# Patient Record
Sex: FEMALE | Race: WHITE | NOT HISPANIC OR LATINO | URBAN - METROPOLITAN AREA
[De-identification: names, ages, dates, MRNs, and addresses within clinical notes are randomized per-mention and may not be internally consistent; named-entity substitution may affect disease eponyms.]

---

## 2017-11-14 ENCOUNTER — OFFICE VISIT - HEALTHEAST (OUTPATIENT)
Dept: FAMILY MEDICINE | Facility: CLINIC | Age: 47
End: 2017-11-14

## 2017-11-14 ENCOUNTER — AMBULATORY - HEALTHEAST (OUTPATIENT)
Dept: LAB | Facility: CLINIC | Age: 47
End: 2017-11-14

## 2017-11-14 ENCOUNTER — COMMUNICATION - HEALTHEAST (OUTPATIENT)
Dept: TELEHEALTH | Facility: CLINIC | Age: 47
End: 2017-11-14

## 2017-11-14 DIAGNOSIS — Z13.0 SCREENING, ANEMIA, DEFICIENCY, IRON: ICD-10-CM

## 2017-11-14 DIAGNOSIS — M54.50 CHRONIC BILATERAL LOW BACK PAIN WITHOUT SCIATICA: ICD-10-CM

## 2017-11-14 DIAGNOSIS — Z13.1 SCREENING FOR DIABETES MELLITUS: ICD-10-CM

## 2017-11-14 DIAGNOSIS — G89.29 CHRONIC BILATERAL LOW BACK PAIN WITHOUT SCIATICA: ICD-10-CM

## 2017-11-14 DIAGNOSIS — Z13.29 SCREENING FOR ENDOCRINE DISORDER: ICD-10-CM

## 2017-11-14 DIAGNOSIS — Z13.220 SCREENING, LIPID: ICD-10-CM

## 2017-11-14 DIAGNOSIS — R63.5 WEIGHT GAIN: ICD-10-CM

## 2017-11-14 DIAGNOSIS — R73.9 HYPERGLYCEMIA: ICD-10-CM

## 2017-11-14 DIAGNOSIS — E55.9 VITAMIN D DEFICIENCY DISEASE: ICD-10-CM

## 2017-11-14 DIAGNOSIS — M17.0 OSTEOARTHRITIS OF BOTH KNEES, UNSPECIFIED OSTEOARTHRITIS TYPE: ICD-10-CM

## 2017-11-14 DIAGNOSIS — Z13.228 ENCOUNTER FOR SCREENING FOR METABOLIC DISORDER: ICD-10-CM

## 2017-11-14 DIAGNOSIS — Z13.29 SCREENING FOR THYROID DISORDER: ICD-10-CM

## 2017-11-14 DIAGNOSIS — E78.49 OTHER HYPERLIPIDEMIA: ICD-10-CM

## 2017-11-14 DIAGNOSIS — K21.9 GASTROESOPHAGEAL REFLUX DISEASE WITHOUT ESOPHAGITIS: ICD-10-CM

## 2017-11-14 RX ORDER — AMITRIPTYLINE HYDROCHLORIDE 50 MG/1
TABLET ORAL
Refills: 3 | Status: SHIPPED | COMMUNITY
Start: 2017-10-04

## 2017-11-14 ASSESSMENT — MIFFLIN-ST. JEOR: SCORE: 1893.91

## 2017-11-14 NOTE — ASSESSMENT & PLAN NOTE
Vitamin d level is low. It is 21.3 but I would like to see it closer to 60.  I recommend you starttaking (or increase current intake of) over the counter vitamin D3, 2000 IU daily.  We can recheck your vitamin D level in 3 months or at your next visit.

## 2017-11-14 NOTE — ASSESSMENT & PLAN NOTE
Lab Results   Component Value Date    CHOL 225 (H) 11/14/2017     Lab Results   Component Value Date    HDL 63 11/14/2017     Lab Results   Component Value Date    LDLCALC 133 (H) 11/14/2017     Lab Results   Component Value Date    TRIG 146 11/14/2017         Weight reduction recommended.

## 2017-11-14 NOTE — ASSESSMENT & PLAN NOTE
INSULIN RESISTANCE/ METABOLIC SYNDROME WORK UPCONCERNING  Lab Results    Value Date    HGBA1C 5.3 11/14/2017      Fasting blood sugar was 87.  (>100 indicative of metabolic syndrome)  Waist circumference was 54 in greater than 35 inches in women and 40 in men is indicative of metabolic syndrome. And central obesity was apparent.  Triglycerides were 146  (>150 is indicative of metabolic syndrome)  HDL 63 (<40 in females and <50 in males is indicative of metabolic syndrome)  bp was not elevated (greater than 140/85 is indicative of metabolic syndrome)  When three or more of the above are present it is an indication that Metabolic syndrome is present - for now we will watch and strongly recommend weight reduction.    Her elevated waist circumference also is an indication that she  could benefit fromChromium 400 mg orally q day.  So that was prescribed as well.

## 2017-11-14 NOTE — ASSESSMENT & PLAN NOTE
Weight reduction recommended.  She  is participating in the Weight Loss Program at [facility].

## 2017-11-14 NOTE — ASSESSMENT & PLAN NOTE
She has had osteoarthritis in both knees.  She had a right knee replacement in January 2017 and no longer has pain in that knee.  However, she has left knee pain 3-4 times a week.  She likes to do cross fit and this knee pain is hindering her activity.  Weight loss is strongly recommended.

## 2017-11-14 NOTE — ASSESSMENT & PLAN NOTE
She gets this approximately once a week and weight loss is strongly recommended.  She started the weight loss clinic at the [facility] today.

## 2017-11-14 NOTE — ASSESSMENT & PLAN NOTE
If she does not take Prilosec 20 mg orally per day she has symptoms daily.  Currently she is controlled with Prilosec 20 mg orally per day.  Weight loss is strongly recommended.  She is starting the weight loss program at the [facility] today.

## 2017-11-15 LAB
CHOLEST SERPL-MCNC: 225 MG/DL
FASTING STATUS PATIENT QL REPORTED: YES
HBA1C MFR BLD: 5.3 % (ref 3.5–6)
HDLC SERPL-MCNC: 63 MG/DL
LDLC SERPL CALC-MCNC: 133 MG/DL
TRIGL SERPL-MCNC: 146 MG/DL

## 2017-12-12 ENCOUNTER — COMMUNICATION - HEALTHEAST (OUTPATIENT)
Dept: FAMILY MEDICINE | Facility: CLINIC | Age: 47
End: 2017-12-12

## 2017-12-19 ENCOUNTER — OFFICE VISIT - HEALTHEAST (OUTPATIENT)
Dept: FAMILY MEDICINE | Facility: CLINIC | Age: 47
End: 2017-12-19

## 2017-12-19 ASSESSMENT — MIFFLIN-ST. JEOR: SCORE: 1855.81

## 2017-12-19 NOTE — ASSESSMENT & PLAN NOTE
Continue phentermine.     Initial Weight: 276.7 lbs  Weight: 268 lb 4.8 oz (121.7 kg)  Weight loss from initial: 8.4  % Weight loss: 3.04 %

## 2018-01-16 ENCOUNTER — COMMUNICATION - HEALTHEAST (OUTPATIENT)
Dept: FAMILY MEDICINE | Facility: CLINIC | Age: 48
End: 2018-01-16

## 2018-01-23 ENCOUNTER — OFFICE VISIT - HEALTHEAST (OUTPATIENT)
Dept: FAMILY MEDICINE | Facility: CLINIC | Age: 48
End: 2018-01-23

## 2018-01-23 DIAGNOSIS — K21.9 GASTROESOPHAGEAL REFLUX DISEASE WITHOUT ESOPHAGITIS: ICD-10-CM

## 2018-01-23 DIAGNOSIS — M17.0 OSTEOARTHRITIS OF BOTH KNEES, UNSPECIFIED OSTEOARTHRITIS TYPE: ICD-10-CM

## 2018-01-23 DIAGNOSIS — E78.49 OTHER HYPERLIPIDEMIA: ICD-10-CM

## 2018-01-23 DIAGNOSIS — R73.9 HYPERGLYCEMIA: ICD-10-CM

## 2018-01-23 DIAGNOSIS — E55.9 VITAMIN D DEFICIENCY DISEASE: ICD-10-CM

## 2018-01-23 ASSESSMENT — MIFFLIN-ST. JEOR: SCORE: 1831.77

## 2018-01-23 NOTE — ASSESSMENT & PLAN NOTE
Dx: Body mass index of 45, GERD, osteoarthritis of the knees bilaterally resulting in knee replacement on the right and continued knee pain on the left, and chronic bilateral low back pain.  BECAUSE OF ABOVE PROBLEMS IT IS STRONGLY ADVISED THAT Amanda LOSE WEIGHT.  BELOW IS MY PLAN FOR her     Since Amanda has morbid obesity, if conservative management does not work, could consider surgical management in the form of bariatric surgery.       Ana Hodges PA-C -  primary care provider  Start weight 276 lbs, 11/14/2017  - 263 1/23/2018   Initial goal weight: 248-262 (reduce weight 5-10%) by 8/2018    Initial Weight: 276.7 bsb001 lb (119.3 kg)  Weight loss from initial: 13.7  % Weight loss: 4.95 %      Prescribed meds: Phentermine  Supplements: Fish oil, multivitamin, vitamin D, chromium  Goals this month: Start weight loss program, start to pay more attention to protein/ carb ratio on nutrition lables and if at all possible track diet.  Future topics: She says she likes to go out to eat so we should talk about healthy options- she may be interested in the Mo talk talking about how to prevent diabetes, & trigger foods.  Follow up monthly.

## 2018-01-23 NOTE — ASSESSMENT & PLAN NOTE
If she does not take Prilosec 20 mg orally per day she has symptoms daily.  Currently she is controlled with Prilosec 20 mg orally per day.  Weight loss is strongly recommended.

## 2018-02-21 ENCOUNTER — COMMUNICATION - HEALTHEAST (OUTPATIENT)
Dept: FAMILY MEDICINE | Facility: CLINIC | Age: 48
End: 2018-02-21

## 2018-02-27 ENCOUNTER — OFFICE VISIT - HEALTHEAST (OUTPATIENT)
Dept: FAMILY MEDICINE | Facility: CLINIC | Age: 48
End: 2018-02-27

## 2018-02-27 DIAGNOSIS — E78.49 OTHER HYPERLIPIDEMIA: ICD-10-CM

## 2018-02-27 DIAGNOSIS — K21.9 GASTROESOPHAGEAL REFLUX DISEASE WITHOUT ESOPHAGITIS: ICD-10-CM

## 2018-02-27 DIAGNOSIS — E55.9 VITAMIN D DEFICIENCY DISEASE: ICD-10-CM

## 2018-02-27 DIAGNOSIS — M17.0 OSTEOARTHRITIS OF BOTH KNEES, UNSPECIFIED OSTEOARTHRITIS TYPE: ICD-10-CM

## 2018-02-27 DIAGNOSIS — R73.9 HYPERGLYCEMIA: ICD-10-CM

## 2018-02-27 NOTE — ASSESSMENT & PLAN NOTE
Dx: Body mass index of 45, GERD, osteoarthritis of the knees bilaterally resulting in knee replacement onthe right and continued knee pain on the left, and chronic bilateral low back pain.  BECAUSE OF ABOVE PROBLEMS IT IS STRONGLY ADVISED THAT Amanda LOSE WEIGHT.  BELOW IS MY PLAN FOR her     Since Amanda has morbid obesity, if conservative management does not work, could consider surgical management in the form of bariatric surgery.       Ana Hodges PA-C -  primary care provider  Start weight 276 lbs, 11/14/2017  - 262 2/27/2018   Initial goal weight: 248-262 (reduce weight 5-10%) by 8/2018    Initial Weight: 276.7 lbs  Weight: 262 lb 8 oz (119.1 kg)  Weight loss from initial: 14.2  % Weight loss: 5.13 %  Waist Circumference: 55 inches     Prescribed meds: Phentermine   Supplements: Fish oil, multivitamin, vitamin D, chromium  Future topics: stress, insulin levels, cortisol & trigger foods.  Follow up monthly.

## 2018-03-29 ENCOUNTER — COMMUNICATION - HEALTHEAST (OUTPATIENT)
Dept: FAMILY MEDICINE | Facility: CLINIC | Age: 48
End: 2018-03-29

## 2018-03-29 RX ORDER — PHENTERMINE HYDROCHLORIDE 37.5 MG/1
37.5 TABLET ORAL
Qty: 30 TABLET | Refills: 0 | Status: SHIPPED | OUTPATIENT
Start: 2018-03-29

## 2018-04-26 ENCOUNTER — OFFICE VISIT - HEALTHEAST (OUTPATIENT)
Dept: FAMILY MEDICINE | Facility: CLINIC | Age: 48
End: 2018-04-26

## 2018-04-26 DIAGNOSIS — E78.49 OTHER HYPERLIPIDEMIA: ICD-10-CM

## 2018-04-26 DIAGNOSIS — E55.9 VITAMIN D DEFICIENCY DISEASE: ICD-10-CM

## 2018-04-26 DIAGNOSIS — R73.9 HYPERGLYCEMIA: ICD-10-CM

## 2018-04-26 LAB
CHOLEST SERPL-MCNC: 184 MG/DL
FASTING STATUS PATIENT QL REPORTED: YES
HBA1C MFR BLD: 5.3 % (ref 3.5–6)
HDLC SERPL-MCNC: 51 MG/DL
LDLC SERPL CALC-MCNC: 117 MG/DL
TRIGL SERPL-MCNC: 80 MG/DL

## 2018-04-26 ASSESSMENT — MIFFLIN-ST. JEOR: SCORE: 1804.55

## 2018-04-26 NOTE — ASSESSMENT & PLAN NOTE
Dx: Body mass index of 45, GERD, osteoarthritis of the knees bilaterally resulting in knee replacement on the right and continued knee pain on the left, and chronic bilateral low back pain.  BECAUSE OF ABOVE PROBLEMS IT IS STRONGLY ADVISED THAT Amanda LOSE WEIGHT.  BELOW IS MY PLAN FOR her      Since Amanda has morbid obesity, if conservative management does not work, could consider surgical management in the form of bariatric surgery.      Ana Hodges PA-C -  primary care provider  Initial Weight: 276.7 lbs bmi 45 11/14/2017  Weight: 257 lb (116.6 kg) - bmi 42 4/26/2018   Weight loss from initial: 19.7  % Weight loss: 7.12 %    Are you experiencing any side effects to the medications:  No.   Hunger control:  good  - except it is hard on the go.   Exercise was discussed: cross fit 3-4 times a week.   Taking supplements:  taking Fish oil, multivitamin, vitamin D, chromium  Discussed journalingfood:  No. But she stopped drinking diet coke.   Patient is pleased with the current results:  yes  The patient is following the nutrition plan:  yes  Barriers to losing weight:  Discussed diet coke, she has been abstaining from it since the last time we met.  She identifies it as a trigger.    Phentermine 37.5 grams orally per day  11/1/17-present -is currently lost 19.7 pounds on this and continues to lose so we will continue

## 2018-04-27 LAB
25(OH)D3 SERPL-MCNC: 53.6 NG/ML (ref 30–80)
25(OH)D3 SERPL-MCNC: 53.6 NG/ML (ref 30–80)

## 2021-05-31 VITALS — WEIGHT: 263 LBS | BODY MASS INDEX: 42.27 KG/M2 | HEIGHT: 66 IN

## 2021-05-31 VITALS — HEIGHT: 66 IN | WEIGHT: 268.3 LBS | BODY MASS INDEX: 43.12 KG/M2

## 2021-05-31 VITALS — BODY MASS INDEX: 44.47 KG/M2 | HEIGHT: 66 IN | WEIGHT: 276.7 LBS

## 2021-06-01 VITALS — WEIGHT: 262.5 LBS | BODY MASS INDEX: 43.02 KG/M2

## 2021-06-01 VITALS — BODY MASS INDEX: 41.3 KG/M2 | HEIGHT: 66 IN | WEIGHT: 257 LBS

## 2021-06-14 NOTE — PROGRESS NOTES
ASSESSMENT AND PLAN:   I spent 40 minutes with the patient. 100 % of that time was spent face to face.    Dx: Body mass index of 45, GERD, osteoarthritis of the knees bilaterally resulting in knee replacement on the right and continued knee pain on the left, and chronic bilateral low back pain.  BECAUSE OF ABOVE PROBLEMS IT IS STRONGLY ADVISED THAT Amanda LOSE WEIGHT.  BELOW IS MY PLAN FOR her     Since Amanda has morbid obesity, if conservative management does not work, could consider surgical management in the form of bariatric surgery.       Ana Hodges PA-C -  primary care provider  Start weight 276 lbs, 11/14/2017    Initial goal weight: 248-262 (reduce weight 5-10%) by 8/2018     Prescribed meds: Phentermine  Supplements: Fish oil, multivitamin, vitamin D, chromium  Goals this month: Start weight loss program, start to pay more attention to protein/ carb ratio on nutrition lables and if at all possible track diet.  Future topics: She says she likes to go out to eat so we should talk about healthy options- she may be interested in the Mo talk talking about how to prevent diabetes, & trigger foods.  Follow up monthly.    Recommended macronutrients;   Calories: 1630 daily  Protein  grams per day  carbs 50-75 grams per day   Healthy fats (nuts, cheese, olive oil, butter, avacado etc) to satiety but not excess       Problem List Items Addressed This Visit     BMI 45.0-49.9, adult     Weight reduction recommended.  She  is participating in the Weight Loss Program at [facility].          GERD (gastroesophageal reflux disease)     If she does not take Prilosec 20 mg orally per day she has symptoms daily.  Currently she is controlled with Prilosec 20 mg orally per day.  Weight loss is strongly recommended.  She is starting the weight loss program at the [facility] clinic today.         Osteoarthritis of knees, bilateral     She has had osteoarthritis in both knees.  She had a right  knee replacement in January 2017 and no longer has pain in that knee.  However, she has left knee pain 3-4 times a week.  She likes to do cross fit and this knee pain is hindering her activity.  Weight loss is strongly recommended.         Chronic bilateral low back pain without sciatica     She gets this approximately once a week and weight loss is strongly recommended.  She started the weight loss clinic at the [facility] program today.         hyperlipidemia     Lab Results   Component Value Date    CHOL 225 (H) 11/14/2017     Lab Results   Component Value Date    HDL 63 11/14/2017     Lab Results   Component Value Date    LDLCALC 133 (H) 11/14/2017     Lab Results   Component Value Date    TRIG 146 11/14/2017         Weight reduction recommended.          Hyperglycemia     INSULIN RESISTANCE/ METABOLIC SYNDROME WORK UP CONCERNING  Lab Results   Component Value Date    HGBA1C 5.3 11/14/2017      Fasting blood sugar was 87.  (>100 indicative of metabolic syndrome)  Waist circumference was 54 in greater than 35 inches in women and 40 in men is indicative of metabolic syndrome. And central obesity was apparent.  Triglycerides were 146  (>150 is indicative of metabolic syndrome)  HDL 63 (<40 in females and <50 in males is indicative of metabolic syndrome)  bp was not elevated (greater than 140/85 is indicative of metabolic syndrome)  When three or more of the above are present it is an indication that Metabolic syndrome is present - for now we will watch and strongly recommend weight reduction.    Her elevated waist circumference also is an indication that she  could benefit from Chromium 400 mg orally q day.  So that was prescribed as well.               ______________________________________________________________________    MORE DETAILED DISCUSSION RE TODAY'S VISIT:    OBESITY  Amanda  patient attended group visit to learn about obesity as a disease, an approach to treatment and metabolic factors.   Nutrition counseling reviewed.    She will start food journaling and contemplating how to increase her activity level as well.     Phentermine discussed. The risks (including addiction, erratic heart rate rhythm, insomnia, anxiety, birth defects, dry mouth, elevated blood pressure, and more) discussed.  There is only one benefit to phentermine - weight loss. She wishes to proceed.  contraception -uses oral contraceptive pills.    THYROID Normal  Lab Results   Component Value Date    TSH 4.62 11/14/2017        INSULIN RESISTANCE/ METABOLIC SYNDROME WORK UP CONCERNING  Lab Results   Component Value Date    HGBA1C 5.3 11/14/2017      Fasting blood sugar was 87.  (>100 indicative of metabolic syndrome)  Waist circumference was 54 in greater than 35 inches in women and 40 in men is indicative of metabolic syndrome. And central obesity was apparent.  Triglycerides were 146  (>150 is indicative of metabolic syndrome)  HDL 63 (<40 in females and <50 in males is indicative of metabolic syndrome)  bp was not elevated (greater than 140/85 is indicative of metabolic syndrome)  When three or more of the above are present it is an indication that Metabolic syndrome is present - for now we will watch and strongly recommend weight reduction.    Her elevated waist circumference also is an indication that she  could benefit from Chromium 400 mg orally q day.  So that was prescribed as well.     VITAMIN D DEFICIENCY  Vitamin D, Total (25-Hydroxy)   Date Value Ref Range Status   11/14/2017 21.3 (L) 30.0 - 80.0 ng/mL Final     Vitamin d level is low. It is 21.3 but I would like to see it closer to 60.  I recommend you start taking (or increase current intake of) over the counter vitamin D3, 2000 IU daily.  We can recheck your vitamin D level in 3 months or at your next visit.        VITAMINS   Recommend a Multivitamin, vitamin D 2000 IU per day and fish oil.  "    _________________________________________________________________    SUBJECTIVE: Amanda Rocha is a 46 y.o. female  comes in for BMI of 45, gastroesophageal reflux disease, osteoarthritis of the knees resulting in knee replacement, and low back pain chronically.  She desires weight reduction.   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  She  has the following hobbies: Shopping, watching TV and doing cross-fit.     WEIGHT LOSS INTAKE    Reason for wanting to join program: \"feel better, healthier\"  Goal weight: 160  Last time at that weight:  Age 29  Overweight whole life:  No: 30's    Family history:    Obesity & high cholesterol    History of illicit drug or alcohol abuse:  No  History of eating disorder:  No    Triggers for eating: hunger, boredom, habit    Typical Daily Food:   Breakfast: variety:  Eggs, yogurt   Lunch: salad/sandwich, fruit   Dinner: meat/veggie, rice   Snacks: popcorn, chocolate    Contraception:  oral contraceptives (estrogen/progesterone)    See weight loss program intake form for more information/details.       ROS  A comprehensive review of systems was negative.  Pertinent items are noted in HPI.    EXAM  Initial Weight: 276.7 lbs  Weight: 276 lb 11.2 oz (125.5 kg)  Weight loss from initial: 0  % Weight loss: 0 %  Body Fat %: 53.5  Waist Circumference: 54 inches  Neck Circumference: 18 inches     /70  Pulse 88  Resp 16  Ht 5' 5.5\" (1.664 m)  Wt (!) 276 lb 11.2 oz (125.5 kg)  LMP 11/06/2017 (Approximate)  Breastfeeding? No  BMI 45.34 kg/m2   Skin: Skin Tags: absent, Acanthosis: absent and Striae: present  Thyroid: normal to inspection and palpation  Cardiac: Regular rate and rhythm  Lungs: clear  Leg Edema: absent  Abdomen: abdomen is soft without significant tenderness, masses, organomegaly or guarding  Other Findings: none   "

## 2021-06-14 NOTE — PROGRESS NOTES
ASSESSMENT AND PLAN:   I spent  25 minutes with the patient. 100 % of that time was spent face to face.    Dx: Body mass index of 45, GERD, osteoarthritis of the knees bilaterally resulting in knee replacement on the right and continued knee pain on the left, and chronic bilateral low back pain.  BECAUSE OF ABOVE PROBLEMS IT IS STRONGLY ADVISED THAT Amanda LOSE WEIGHT.  BELOW IS MY PLAN FOR her     Since Amanda has morbid obesity, if conservative management does not work, could consider surgical management in the form of bariatric surgery.       Ana Hodges PA-C -  primary care provider  Start weight 276 lbs, 11/14/2017  - 268 12/19/2017  Initial goal weight: 248-262 (reduce weight 5-10%) by 8/2018     Prescribed meds: Phentermine  Supplements: Fish oil, multivitamin, vitamin D, chromium  Goals this month: Start weight loss program, start to pay more attention to protein/ carb ratio on nutrition lables and if at all possible track diet.  Future topics: She says she likes to go out to eat so we should talk about healthy options- she may be interested in the Mo talk talking about how to prevent diabetes, & trigger foods.  Follow up monthly.          Problem List Items Addressed This Visit     BMI 45.0-49.9, adult     Weight reduction recommended.  She  is participating in the Weight Loss Program at [facility].          GERD (gastroesophageal reflux disease)     If she does not take Prilosec 20 mg orally per day she has symptoms daily.  Currently she is controlled with Prilosec 20 mg orally per day.  Weight loss is strongly recommended.  She is starting the weight loss program at the [facility] clinic today.         Osteoarthritis of knees, bilateral     She has had osteoarthritis in both knees.  She had a right knee replacement in January 2017 and no longer has pain in that knee.  However, she has left knee pain 3-4 times a week.  She likes to do cross fit and this knee pain is  hindering her activity.  Weight loss is strongly recommended.         Chronic bilateral low back pain without sciatica     She gets this approximately once a week and weight loss is strongly recommended.  She started the weight loss clinic at the [facility] program today.         hyperlipidemia     Lab Results   Component Value Date    CHOL 225 (H) 11/14/2017     Lab Results   Component Value Date    HDL 63 11/14/2017     Lab Results   Component Value Date    LDLCALC 133 (H) 11/14/2017     Lab Results   Component Value Date    TRIG 146 11/14/2017         Weight reduction recommended.          Hyperglycemia     INSULIN RESISTANCE/ METABOLIC SYNDROME WORK UP CONCERNING  Lab Results   Component Value Date    HGBA1C 5.3 11/14/2017      Fasting blood sugar was 87.  (>100 indicative of metabolic syndrome)  Waist circumference was 54 in greater than 35 inches in women and 40 in men is indicative of metabolic syndrome. And central obesity was apparent.  Triglycerides were 146  (>150 is indicative of metabolic syndrome)  HDL 63 (<40 in females and <50 in males is indicative of metabolic syndrome)  bp was not elevated (greater than 140/85 is indicative of metabolic syndrome)  When three or more of the above are present it is an indication that Metabolic syndrome is present - for now we will watch and strongly recommend weight reduction.    Her elevated waist circumference also is an indication that she  could benefit from Chromium 400 mg orally q day.  So that was prescribed as well.                 SUBJECTIVE: Amanda Rocha is a 47 y.o. female  comes in for BMI of 45, gastroesophageal reflux disease, osteoarthritis of the knees resulting in knee replacement, and low back pain chronically.  She desires weight reduction.   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  She  has the following hobbies: Shopping, watching TV and doing cross-fit.     Are you experiencing any side  "effects to the medications:  Phentermine working well, no side effects.   Hunger control:  good  Exercise was discussed: yes - cross fit.   Taking supplements:  Yes fish oil, mvi, vit d, chromium  Discussed journaling food:  No  Breakfast: eggs/ guac, protein shake  Lunch: left overs  Dinner: speg. Squash.  Tomato sauce watching for added sugars  Snacks: dark chocolate almonds  Fastinpm - 9pm.   Drinks: hard liquor - vodka tonic, old fashion, grapefruit with vodka.   Patient is pleased with the current results:  yes  The patient is following the nutrition plan:  no  Barriers to losing weight:  Metabolism:   IR      ROS  A comprehensive review of systems was negative.  Pertinent items are noted in HPI.    EXAM  Initial Weight: 276.7 lbs  Weight: 268 lb 4.8 oz (121.7 kg)  Weight loss from initial: 8.4  % Weight loss: 3.04 %     /78 (Patient Site: Left Arm, Patient Position: Sitting, Cuff Size: Adult Regular)  Pulse 92  Resp 18  Ht 5' 5.5\" (1.664 m)  Wt (!) 268 lb 4.8 oz (121.7 kg)  LMP 2017 (Approximate)  SpO2 95%  Breastfeeding? No  BMI 43.97 kg/m2   Physical Exam   Constitutional: She is oriented to person, place, and time. She appears well-developed and well-nourished. No distress.   HENT:   Head: Normocephalic and atraumatic.   Eyes: Conjunctivae are normal.   Neck: Neck supple.   Cardiovascular: Normal rate and regular rhythm.    Pulmonary/Chest: Effort normal.   Musculoskeletal: Normal range of motion.   Neurological: She is alert and oriented to person, place, and time.   Skin: Skin is warm and dry.   Psychiatric: She has a normal mood and affect.      "

## 2021-06-15 NOTE — PROGRESS NOTES
ASSESSMENT AND PLAN:  I spent 20 minutes with the patient. 100 % of that time was spent face to face.    Problem List Items Addressed This Visit     BMI 40.0-44.9, adult       Dx: Body mass index of 45, GERD, osteoarthritis of the knees bilaterally resulting in knee replacement on the right and continued knee pain on the left, and chronic bilateral low back pain.  BECAUSE OF ABOVE PROBLEMS IT IS STRONGLY ADVISED THAT Amanda LOSE WEIGHT.  BELOW IS MY PLAN FOR her     Since Amanda has morbid obesity, if conservative management does not work, could consider surgical management in the form of bariatric surgery.       Ana Hodges PA-C -  primary care provider  Start weight 276 lbs, 11/14/2017  - 263 1/23/2018   Initial goal weight: 248-262 (reduce weight 5-10%) by 8/2018    Initial Weight: 276.7 lbs  Weight: 263 lb (119.3 kg)  Weight loss from initial: 13.7  % Weight loss: 4.95 %      Prescribed meds: Phentermine  Supplements: Fish oil, multivitamin, vitamin D, chromium  Goals this month: Start weight loss program, start to pay more attention to protein/ carb ratio on nutrition lables and if at all possible track diet.  Future topics: She says she likes to go out to eat so we should talk about healthy options- she may be interested in the Mo talk talking about how to prevent diabetes, & trigger foods.  Follow up monthly.         GERD (gastroesophageal reflux disease)     If she does not take Prilosec 20 mg orally per day she has symptoms daily.  Currently she is controlled with Prilosec 20 mg orally per day.  Weight loss is strongly recommended.             Osteoarthritis of knees, bilateral     she says the left knee no loner bother her and right knee is pain free after surgery 1/2017           hyperlipidemia     Plan to recheck after 2/14/2018           Hyperglycemia     Plan to recheck a1c after 2/14/2018           Vitamin D deficiency disease     Plan to recheck after 2/14/2018                  SUBJECTIVE:  "Amanda Rocha is a 47 y.o. female  comes in for BMI of 45, gastroesophageal reflux disease, osteoarthritis of the knees resulting in knee replacement, and low back pain chronically.  She desires weight reduction.   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  She  has the following hobbies: Shopping, watching TV and doing cross-fit.     She had gynecological surgery Dec 28th, 2017 and is now recovered. She says things are going well and she is not feeling deprived.  Over the holidays she did indulge on Eze Day but tried not to indulge on the days and weeks surrounding that.    Are you experiencing any side effects to the medications:  Phentermine working well, no side effects.   Hunger control:  good  Exercise was discussed: yes - cross fit.   Taking supplements:  Yes fish oil, mvi, vit d, chromium  Discussed journaling food:  No  Breakfast: eggs/ guac, protein shake  Lunch: left overs  Dinner: speg. Squash.  Tomato sauce watching for added sugars  Snacks: nuts, cheese, fruit or dried fruit.   Fastinpm - 9pm.   Drinks: coffee, water, tim. Liquor a few times  A week. hard liquor - vodka tonic, old fashion, grapefruit with vodka.   Patient is pleased with the current results:  yes  The patient is following the nutrition plan:  no  Barriers to losing weight:  Metabolism:   IR      ROS  A comprehensive review of systems was negative.  Pertinent items are noted in HPI.    EXAM  Initial Weight: 276.7 lbs  Weight: 263 lb (119.3 kg)  Weight loss from initial: 13.7  % Weight loss: 4.95 %     /76  Pulse 88  Resp 16  Ht 5' 5.5\" (1.664 m)  Wt (!) 263 lb (119.3 kg)  BMI 43.1 kg/m2   Physical Exam   Constitutional: She is oriented to person, place, and time. She appears well-developed and well-nourished. No distress.   HENT:   Head: Normocephalic and atraumatic.   Eyes: Conjunctivae are normal.   Neck: Neck supple.   Cardiovascular: Normal rate and regular rhythm.  "   Pulmonary/Chest: Effort normal.   Musculoskeletal: Normal range of motion.   Neurological: She is alert and oriented to person, place, and time.   Skin: Skin is warm and dry.   Psychiatric: She has a normal mood and affect.

## 2021-06-16 PROBLEM — M17.0 OSTEOARTHRITIS OF KNEES, BILATERAL: Status: ACTIVE | Noted: 2017-11-14

## 2021-06-16 PROBLEM — G89.29 CHRONIC BILATERAL LOW BACK PAIN WITHOUT SCIATICA: Status: ACTIVE | Noted: 2017-11-14

## 2021-06-16 PROBLEM — E78.49 OTHER HYPERLIPIDEMIA: Status: ACTIVE | Noted: 2017-11-16

## 2021-06-16 PROBLEM — E55.9 VITAMIN D DEFICIENCY DISEASE: Status: ACTIVE | Noted: 2017-11-19

## 2021-06-16 PROBLEM — M54.50 CHRONIC BILATERAL LOW BACK PAIN WITHOUT SCIATICA: Status: ACTIVE | Noted: 2017-11-14

## 2021-06-16 PROBLEM — K21.9 GERD (GASTROESOPHAGEAL REFLUX DISEASE): Status: ACTIVE | Noted: 2017-11-14

## 2021-06-16 PROBLEM — R73.9 HYPERGLYCEMIA: Status: ACTIVE | Noted: 2017-11-16

## 2021-06-16 NOTE — PROGRESS NOTES
ASSESSMENT AND PLAN:  I spent 20 minutes with the patient. 100 % of that time was spent face to face.    Problem List Items Addressed This Visit     BMI 40.0-44.9, adult     Dx: Body mass index of 45, GERD, osteoarthritis of the knees bilaterally resulting in knee replacement on the right and continued knee pain on the left, and chronic bilateral low back pain.  BECAUSE OF ABOVE PROBLEMS IT IS STRONGLY ADVISED THAT Amanda LOSE WEIGHT.  BELOW IS MY PLAN FOR her     Since Amanda has morbid obesity, if conservative management does not work, could consider surgical management in the form of bariatric surgery.       Ana Hodges PA-C -  primary care provider  Start weight 276 lbs, 11/14/2017  - 262 2/27/2018   Initial goal weight: 248-262 (reduce weight 5-10%) by 8/2018    Initial Weight: 276.7 lbs  Weight: 262 lb 8 oz (119.1 kg)  Weight loss from initial: 14.2  % Weight loss: 5.13 %  Waist Circumference: 55 inches     Prescribed meds: Phentermine   Supplements: Fish oil, multivitamin, vitamin D, chromium  Future topics: stress, insulin levels, cortisol & trigger foods.  Follow up monthly.         Relevant Medications    phentermine (ADIPEX-P) 37.5 mg tablet    GERD (gastroesophageal reflux disease)     If she does not take Prilosec 20 mg orally per day she has symptoms daily.  Currently she is controlled with Prilosec 20 mg orally per day.  Weight loss is strongly recommended.         Osteoarthritis of knees, bilateral     Currently pain free.          hyperlipidemia     Plan to recheck - order placed.          Relevant Orders    Lipid Cascade    Hyperglycemia     Plan to recheck - order placed.          Relevant Orders    Glycosylated Hemoglobin A1c    Vitamin D deficiency disease - Primary     Plan to recheck - order placed.          Relevant Orders    Vitamin D, Total (25-Hydroxy)      Other Visit Diagnoses     BMI 45.0-49.9, adult        Relevant Medications    phentermine (ADIPEX-P) 37.5 mg tablet         Chief Complaint   Patient presents with     Weight Loss         SUBJECTIVE: Amanda Rocha is a 47 y.o. female  comes in for BMI of 45, gastroesophageal reflux disease, osteoarthritis of the knees resulting in knee replacement, and low back pain chronically.  She desires weight reduction.   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  She  has the following hobbies: Shopping, watching TV and doing cross-fit.    Are you experiencing any side effects to the medications:  Phentermine working well, no side effects.   Hunger control:  good  Exercise was discussed: yes - cross fit - still doing this.   Taking supplements:  Yes fish oil, mvi, vit d, chromium  Discussed journaling food:  No  Breakfast: eggs/ guac, protein shake  Lunch: left overs  Dinner: speg. Squash.  Tomato sauce watching for added sugars  Snacks: nuts, cheese, fruit or dried fruit.   Fastinpm - 9pm.   Drinks: coffee, water, tim. Liquor a few times  A week. hard liquor - vodka tonic, old fashion, grapefruit with vodka.   Patient is pleased with the current results:  yes  The patient is following the nutrition plan:  no  Barriers to losing weight:  Metabolism:   IR      ROS  A comprehensive review of systems was negative.  Pertinent items are noted in HPI.    EXAM  Initial Weight: 276.7 lbs  Weight: 262 lb 8 oz (119.1 kg)  Weight loss from initial: 14.2  % Weight loss: 5.13 %  Waist Circumference: 55 inches     /68 (Patient Site: Left Arm, Patient Position: Sitting, Cuff Size: Adult Large)  Pulse 74  Wt (!) 262 lb 8 oz (119.1 kg)  BMI 43.02 kg/m2   Physical Exam   Constitutional: She is oriented to person, place, and time. She appears well-developed and well-nourished. No distress.   HENT:   Head: Normocephalic and atraumatic.   Eyes: Conjunctivae are normal.   Neck: Neck supple.   Cardiovascular: Normal rate and regular rhythm.    Pulmonary/Chest: Effort normal.   Musculoskeletal: Normal range  of motion.   Neurological: She is alert and oriented to person, place, and time.   Skin: Skin is warm and dry.   Psychiatric: She has a normal mood and affect.

## 2021-06-17 NOTE — PROGRESS NOTES
ASSESSMENT AND PLAN:    We spent 20 minutes today in direct patient contact, 100% of the time in consultation concerning medical problems as listed below.       Problem List Items Addressed This Visit        Unprioritized    BMI 40.0-44.9, adult     Dx: Body mass index of 45, GERD, osteoarthritis of the knees bilaterally resulting in knee replacement on the right and continued knee pain on the left, and chronic bilateral low back pain.  BECAUSE OF ABOVE PROBLEMS IT IS STRONGLY ADVISED THAT Amanda LOSE WEIGHT.  BELOW IS MY PLAN FOR her      Since Amanda has morbid obesity, if conservative management does not work, could consider surgical management in the form of bariatric surgery.      Ana Hodges PA-C -  primary care provider  Initial Weight: 276.7 lbs bmi 45 11/14/2017  Weight: 257 lb (116.6 kg) - bmi 42 4/26/2018   Weight loss from initial: 19.7  % Weight loss: 7.12 %    Are you experiencing any side effects to the medications:  No.   Hunger control:  good  - except it is hard on the go.   Exercise was discussed: cross fit 3-4 times a week.   Taking supplements:  taking Fish oil, multivitamin, vitamin D, chromium  Discussed journaling food:  No. But she stopped drinking diet coke.   Patient is pleased with the current results:  yes  The patient is following the nutrition plan:  yes  Barriers to losing weight:  Discussed diet coke, she has been abstaining from it since the last time we met.  She identifies it as a trigger.    Phentermine 37.5 grams orally per day  11/1/17-present -is currently lost 19.7 pounds on this and continues to lose so we will continue         hyperlipidemia     She is planning to get this re done today         Hyperglycemia     We will check hemoglobin A1c today         Vitamin D deficiency disease     Vitamin D lab is ordered           Other Visit Diagnoses     BMI 45.0-49.9, adult               Chief Complaint   Patient presents with     Follow-up        HPI  Amanda Rocha is a 47  "y.o. female is happy and feeling like things are going well.  She did travel a little bit and when she traveled she did not take her phentermine.  Still she is feeling good because she lost weight anyway.  Since last time she was here she is feeling really happy that she has not had Diet Coke.  She really likes the Diet Coke from [facility] in particular.    History   Smoking Status     Former Smoker   Smokeless Tobacco     Never Used      Current Outpatient Prescriptions on File Prior to Visit   Medication Sig Dispense Refill     amitriptyline (ELAVIL) 50 MG tablet TAKE 1 TAB BY MOUTH DAILY AT BEDTIME.  3     levonorgestrel (MIRENA) 20 mcg/24 hr (5 years) IUD 1 each by Intrauterine route once.       omeprazole (PRILOSEC) 20 MG capsule TAKE 1 CAP BY MOUTH DAILY. TAKE 1 HOUR BEFORE A MEAL.  2     phentermine (ADIPEX-P) 37.5 mg tablet Take 1 tablet (37.5 mg total) by mouth daily before breakfast. 30 tablet 0     No current facility-administered medications on file prior to visit.       Allergies   Allergen Reactions     Wellbutrin [Bupropion Hcl]        Review of Systems   Constitutional: Negative.    HENT: Negative.    Eyes: Negative.    Respiratory: Negative.    Cardiovascular: Negative.    Gastrointestinal: Negative.    Endocrine: Negative.    Genitourinary: Negative.    Musculoskeletal: Negative.    Skin: Negative.    Neurological: Negative.    Hematological: Negative.    Psychiatric/Behavioral: Negative.         OBJECTIVE: /76 (Patient Site: Left Arm, Patient Position: Sitting, Cuff Size: Adult Large)  Pulse 82  Temp 98  F (36.7  C) (Oral)   Resp 16  Ht 5' 5.5\" (1.664 m)  Wt (!) 257 lb (116.6 kg)  SpO2 98%  Breastfeeding? No  BMI 42.12 kg/m2   Physical Exam   Constitutional: She is oriented to person, place, and time. She appears well-developed and well-nourished. No distress.   HENT:   Head: Normocephalic and atraumatic.   Eyes: Conjunctivae are normal.   Neck: Neck supple.   Cardiovascular: Normal " rate and regular rhythm.    Pulmonary/Chest: Effort normal.   Musculoskeletal: Normal range of motion.   Neurological: She is alert and oriented to person, place, and time.   Skin: Skin is warm and dry.   Psychiatric: She has a normal mood and affect.        This note was created using Dragon dictation.  Please excuse any grammatical errors.

## 2021-08-21 ENCOUNTER — HEALTH MAINTENANCE LETTER (OUTPATIENT)
Age: 51
End: 2021-08-21

## 2021-10-16 ENCOUNTER — HEALTH MAINTENANCE LETTER (OUTPATIENT)
Age: 51
End: 2021-10-16

## 2022-10-01 ENCOUNTER — HEALTH MAINTENANCE LETTER (OUTPATIENT)
Age: 52
End: 2022-10-01

## 2023-10-15 ENCOUNTER — HEALTH MAINTENANCE LETTER (OUTPATIENT)
Age: 53
End: 2023-10-15